# Patient Record
Sex: FEMALE
[De-identification: names, ages, dates, MRNs, and addresses within clinical notes are randomized per-mention and may not be internally consistent; named-entity substitution may affect disease eponyms.]

---

## 2019-04-07 ENCOUNTER — HOSPITAL ENCOUNTER (EMERGENCY)
Dept: HOSPITAL 31 - C.ER | Age: 49
Discharge: HOME | End: 2019-04-07
Payer: COMMERCIAL

## 2019-04-07 VITALS
TEMPERATURE: 97.8 F | RESPIRATION RATE: 16 BRPM | OXYGEN SATURATION: 100 % | HEART RATE: 76 BPM | DIASTOLIC BLOOD PRESSURE: 74 MMHG | SYSTOLIC BLOOD PRESSURE: 106 MMHG

## 2019-04-07 DIAGNOSIS — F41.9: Primary | ICD-10-CM

## 2019-04-07 NOTE — C.PDOC
History Of Present Illness





49 y/o female presents to ED complaining shes been having anxiety and feeling 

depressed for 2 months now, and felt it got worse yesterday. Patient states she 

has been stressed from work and is unable to sleep or eat, but drinks fluids. 

Patient denies suicidal or homicidal ideation, auditory or visual 

hallucinations, fever, or abdominal pain. Patient states she drinks alcohol 

occasionally and denies drug use. Notes she has an appointment with a doctor on 

4/24/19, but wanted to be seen now. 





Time Seen by Provider: 04/07/19 15:17


Chief Complaint (Nursing): Anxiety


History Per: Patient


History/Exam Limitations: no limitations


Onset/Duration Of Symptoms: Days


Current Symptoms Are (Timing): Still Present





Past Medical History


Reviewed: Historical Data, Nursing Documentation, Vital Signs


Vital Signs: 





                                Last Vital Signs











Temp  97.8 F   04/07/19 15:12


 


Pulse  76   04/07/19 15:12


 


Resp  16   04/07/19 15:12


 


BP  106/74   04/07/19 15:12


 


Pulse Ox  100   04/07/19 15:12














- Medical History


PMH: Anxiety


Family History: States: No Known Family Hx





- Social History


Hx Alcohol Use: Yes


Hx Substance Use: No





- Immunization History


Hx Tetanus Toxoid Vaccination: No


Hx Influenza Vaccination: No


Hx Pneumococcal Vaccination: No





Review Of Systems


Except As Marked, All Systems Reviewed And Found Negative.


Constitutional: Negative for: Fever


Gastrointestinal: Negative for: Abdominal Pain


Psych: Positive for: Anxiety, Depression.  Negative for: Psychosis, Suicidal 

ideation (or homicidal ideation)





Physical Exam





- Physical Exam


Appears: Non-toxic, No Acute Distress


Skin: Warm, Dry


Head: Atraumatic, Normacephalic


Eye(s): bilateral: Normal Inspection


Oral Mucosa: Moist


Neck: Supple


Cardiovascular: Rhythm Regular, No Murmur


Respiratory: Normal Breath Sounds, No Rales, No Rhonchi, No Wheezing


Gastrointestinal/Abdominal: Soft, No Tenderness


Extremity: Bilateral: Atraumatic


Neurological/Psych: Oriented x3, Normal Speech





ED Course And Treatment


O2 Sat by Pulse Oximetry: 100 (RA)


Pulse Ox Interpretation: Normal





Medical Decision Making


Medical Decision Making: 





15:26


Crisis consulted.





Disposition





- Disposition


Referrals: 


[facility] at [facility] [place] [Outside]


[facility] at [facility] [Outside]


[facility] [Outside]


Disposition: HOME/ ROUTINE


Disposition Time: 16:10


Condition: GOOD


Additional Instructions: 


Follow up with [facility] crisis as directed by the psych team. 


Instructions:  Social Anxiety Disorder


Forms:  [email or web address] Connect (English)





- Clinical Impression


Clinical Impression: 


 Anxiety








- Scribe Statement


The provider has reviewed the documentation as recorded by the Ebenezeribzander Garcia





Provider Attestation: 








All medical record entries made by the Ebenezeribzander were at my direction and 

personally dictated by me. I have reviewed the chart and agree that the record 

accurately reflects my personal performance of the history, physical exam, 

medical decision making, and the department course for this patient. I have also

 personally directed, reviewed, and agree with the discharge instructions and d

isposition.